# Patient Record
Sex: MALE | Race: OTHER | HISPANIC OR LATINO | URBAN - METROPOLITAN AREA
[De-identification: names, ages, dates, MRNs, and addresses within clinical notes are randomized per-mention and may not be internally consistent; named-entity substitution may affect disease eponyms.]

---

## 2024-08-13 ENCOUNTER — EMERGENCY (EMERGENCY)
Facility: HOSPITAL | Age: 49
LOS: 1 days | Discharge: HOME | End: 2024-08-13
Attending: EMERGENCY MEDICINE | Admitting: EMERGENCY MEDICINE
Payer: COMMERCIAL

## 2024-08-13 VITALS
TEMPERATURE: 99 F | HEART RATE: 100 BPM | RESPIRATION RATE: 16 BRPM | OXYGEN SATURATION: 98 % | SYSTOLIC BLOOD PRESSURE: 128 MMHG | DIASTOLIC BLOOD PRESSURE: 87 MMHG | WEIGHT: 190.04 LBS

## 2024-08-13 VITALS
OXYGEN SATURATION: 98 % | RESPIRATION RATE: 18 BRPM | SYSTOLIC BLOOD PRESSURE: 125 MMHG | TEMPERATURE: 99 F | DIASTOLIC BLOOD PRESSURE: 84 MMHG | HEART RATE: 102 BPM

## 2024-08-13 PROCEDURE — 87186 SC STD MICRODIL/AGAR DIL: CPT

## 2024-08-13 PROCEDURE — 81001 URINALYSIS AUTO W/SCOPE: CPT

## 2024-08-13 PROCEDURE — 87086 URINE CULTURE/COLONY COUNT: CPT

## 2024-08-13 PROCEDURE — 99284 EMERGENCY DEPT VISIT MOD MDM: CPT

## 2024-08-13 PROCEDURE — 99283 EMERGENCY DEPT VISIT LOW MDM: CPT

## 2024-08-13 RX ORDER — PHENAZOPYRIDINE HYDROCHLORIDE 200 MG/1
1 TABLET ORAL
Qty: 6 | Refills: 0
Start: 2024-08-13 | End: 2024-08-14

## 2024-08-13 RX ORDER — SULFAMETHOXAZOLE AND TRIMETHOPRIM 400; 80 MG/1; MG/1
1 TABLET ORAL ONCE
Refills: 0 | Status: COMPLETED | OUTPATIENT
Start: 2024-08-13 | End: 2024-08-13

## 2024-08-13 RX ORDER — SULFAMETHOXAZOLE AND TRIMETHOPRIM 400; 80 MG/1; MG/1
1 TABLET ORAL
Qty: 14 | Refills: 0
Start: 2024-08-13 | End: 2024-08-19

## 2024-08-13 RX ADMIN — SULFAMETHOXAZOLE AND TRIMETHOPRIM 1 TABLET(S): 400; 80 TABLET ORAL at 14:10

## 2024-08-13 NOTE — ED PROVIDER NOTE - NS ED ROS FT
Constitutional: No fever or chills.   GI: No nausea, vomiting, diarrhea or abdominal pain.  : See HPI  MS: No myalgia, muscle weakness, joint pain   Skin: No skin rash.   Except as documented in the HPI, all other systems are negative.

## 2024-08-13 NOTE — ED PROVIDER NOTE - CLINICAL SUMMARY MEDICAL DECISION MAKING FREE TEXT BOX
Pt p/w sx UTI w/o flank pain, abd pain, n/v, f/c. Non toxic appearing. No CVAT. Start Bactrim DS BID x 7 days. Pyridium for sx. D/w pt strict return precautions for f/c, n/v, flank pain, inability to urinate, or other concerning sx. Pt demonstrates understanding of plan

## 2024-08-13 NOTE — ED PROVIDER NOTE - PHYSICAL EXAMINATION
Constitutional: Well appearing, awake, alert, oriented to person, place, time/situation and in no apparent distress. non -toxic appearing  ENMT: Airway patent. Normal MM  Respiratory: Breathes easily. No increased WOB, tachypnea, hypoxia, or accessory mm use. Pt speaks in full sentences.   Gastrointestinal: Abd soft, NT, ND, NABS. No guarding, rebound, or rigidity. No pulsatile abdominal masses. No organomegaly appreciated. No CVAT   Musculoskeletal: Range of motion is not limited  Neuro: Alert and oriented x 3, face symmetric and speech fluent. Strength 5/5 x 4 ext and symmetric, nml gross motor movement, nml gait. No focal deficits noted.  Skin: Skin normal color for race, warm, dry and intact. No evidence of rash.  Psych: Alert and oriented to person, place, time/situation. normal mood and affect. no apparent risk to self or others.

## 2024-08-13 NOTE — ED PROVIDER NOTE - NSFOLLOWUPINSTRUCTIONS_ED_ALL_ED_FT
You are being treated for urinary tract infection (UTI). The treatment is antibiotics. You received the first dose of antibiotic, Bactrim (a sulfa antibiotic, Sulfamethoxazole; Trimethoprim ) here in the ED, and a prescription has been sent to your pharmacy. Take the antibiotics until completed. You are also being prescribed Pyridium (Phenazopyridine) for urinary discomfort. Please note this will turn your urine orange as a side effect, but is not harmful.      Follow up with your regular medical doctor. Return to the ED for fever, shaking chills, vomiting, flank/back pain, inability to urinate, or other concerning symptoms.     Urinary Tract Infection    A urinary tract infection (UTI) is an infection of any part of the urinary tract, which includes the kidneys, ureters, bladder, and urethra. Risk factors include ignoring your need to urinate, wiping back to front if female, being an uncircumcised male, and having diabetes or a weak immune system. Symptoms include frequent urination, pain or burning with urination, foul smelling urine, cloudy urine, pain in the lower abdomen, blood in the urine, and fever. If you were prescribed an antibiotic medicine, take it as told by your health care provider. Do not stop taking the antibiotic even if you start to feel better.    SEEK IMMEDIATE MEDICAL CARE IF YOU HAVE ANY OF THE FOLLOWING SYMPTOMS: severe back or abdominal pain, fever, inability to keep fluids or medicine down, dizziness/lightheadedness, or a change in mental status.     Phenazopyridine Tablets  What is this medication?  PHENAZOPYRIDINE (fen az oh PEER i omari) relieves symptoms caused by irritation of the urinary tract, such as pain, burning, and passing frequent small amounts of urine. It works by numbing the lining of the urinary tract, which decreases pain. It is not an antibiotic. It will not treat a urinary tract infection (UTI).    This medicine may be used for other purposes; ask your health care provider or pharmacist if you have questions.    COMMON BRAND NAME(S): AZO, AZO Urinary Pain Relief Maximum Strength, Azo-100, Azo-Gesic, Azo-Septic, Azo-Standard, Phenazo, Prodium, Pyridium, Urinary Analgesic, Uristat, Uristat Relief, Uristat Ultra, URO-PAIN    What should I tell my care team before I take this medication?  They need to know if you have any of these conditions:    Glucose-6-phosphate dehydrogenase (G6PD) deficiency  Kidney disease  An unusual or allergic reaction to phenazopyridine, other medications, foods, dyes, or preservatives  Pregnant or trying to get pregnant  Breast-feeding  How should I use this medication?  Take this medication by mouth with a glass of water. Follow the directions on the product label. Take after meals. Take your doses at regular intervals. Do not take your medication more often than directed. Do not skip doses or stop your medication early even if you feel better. Do not stop taking except on your care team's advice.    Talk to your care team about the use of this medication in children. Special care may be needed.    Overdosage: If you think you have taken too much of this medicine contact a poison control center or emergency room at once.    NOTE: This medicine is only for you. Do not share this medicine with others.    What if I miss a dose?  If you miss a dose, take it as soon as you can. If it is almost time for your next dose, take only that dose. Do not take double or extra doses.    What may interact with this medication?  Interactions are not expected.    This list may not describe all possible interactions. Give your health care provider a list of all the medicines, herbs, non-prescription drugs, or dietary supplements you use. Also tell them if you smoke, drink alcohol, or use illegal drugs. Some items may interact with your medicine.    What should I watch for while using this medication?  Tell your care team if your symptoms do not improve or if they get worse.    This medication colors body fluids red. This effect is harmless and will go away after you are done taking the medication. It will change urine to a dark orange or red color. The red color may stain clothing. Soft contact lenses may become permanently stained. It is best not to wear soft contact lenses while taking this medication.    If you have diabetes you may get a false positive result for sugar in your urine. Talk to your care team.    What side effects may I notice from receiving this medication?  Side effects that you should report to your care team as soon as possible:    Allergic reactions—skin rash, itching, hives, swelling of the face, lips, tongue, or throat  Side effects that usually do not require medical attention (report to your care team if they continue or are bothersome):    Dark yellow, orange, red, or brown urine  Upset stomach  This list may not describe all possible side effects. Call your doctor for medical advice about side effects. You may report side effects to FDA at 6-475-WVU-5079.    Where should I keep my medication?  Keep out of the reach of children and pets.    Store at room temperature between 15 and 30 degrees C (59 and 86 degrees F). Protect from light and moisture. Throw away any unused medication after the expiration date.    NOTE: This sheet is a summary. It may not cover all possible information. If you have questions about this medicine, talk to your doctor, pharmacist, or health care provider.    Sulfamethoxazole; Trimethoprim Tablets  What is this medication?  SULFAMETHOXAZOLE; TRIMETHOPRIM (suhl fuh meth OK onur zohl; trye METH oh prim) treats infections caused by bacteria. It belongs to a group of medications called sulfonamide antibiotics. It will not treat colds, the flu, or infections caused by viruses.    This medicine may be used for other purposes; ask your health care provider or pharmacist if you have questions.    COMMON BRAND NAME(S): Bacter-Aid DS, Bactrim, Bactrim DS, Septra, Septra DS    What should I tell my care team before I take this medication?  They need to know if you have any of these conditions:    G6PD deficiency  HIV or AIDS  Kidney disease  Liver disease  Low platelet levels  Low red blood cell levels  Poor nutrition  Stomach or intestine problems, such as colitis  Thyroid disease  An unusual or allergic reaction to sulfamethoxazole, trimethoprim, other medications, foods, dyes, or preservatives  Pregnant or trying to get pregnant  Breast-feeding  How should I use this medication?  Take this medication by mouth with a glass of water. Follow the directions on the prescription label. Take your medication at regular intervals. Do not take it more often than directed. Take all of your medication as directed even if you think you are better. Do not skip doses or stop your medication early.    Talk to your care team about the use of this medication in children. While this medication may be prescribed for children as young as 2 months for selected conditions, precautions do apply.    Overdosage: If you think you have taken too much of this medicine contact a poison control center or emergency room at once.    NOTE: This medicine is only for you. Do not share this medicine with others.    What if I miss a dose?  If you miss a dose, take it as soon as you can. If it is almost time for your next dose, take only that dose. Do not take double or extra doses.    What may interact with this medication?  Do not take this medication with any of the following:    Dofetilide  This medication may also interact with the following:    Amantadine  Certain medications for blood pressure or heart disease  Certain medications for depression, such as amitriptyline  Certain medications for diabetes, such as glipizide or glyburide  Certain medications that treat or prevent blood clots, such as warfarin  Cyclosporine  Digoxin  Diuretics  Estrogen and progestin hormones  Indomethacin  Methotrexate  Phenytoin  Procainamide  Pyrimethamine  Zidovudine  This list may not describe all possible interactions. Give your health care provider a list of all the medicines, herbs, non-prescription drugs, or dietary supplements you use. Also tell them if you smoke, drink alcohol, or use illegal drugs. Some items may interact with your medicine.    What should I watch for while using this medication?  Tell your care team if your symptoms do not start to get better or if they get worse.    Do not treat diarrhea with over the counter products. Contact your care team if you have diarrhea that lasts more than 2 days or if it is severe and watery.    This medication may cause serious skin reactions. They can happen weeks to months after starting the medication. Contact your care team right away if you notice fevers or flu-like symptoms with a rash. The rash may be red or purple and then turn into blisters or peeling of the skin. Or, you might notice a red rash with swelling of the face, lips or lymph nodes in your neck or under your arms.    This medication can make you more sensitive to the sun. Keep out of the sun. If you cannot avoid being in the sun, wear protective clothing and sunscreen. Do not use sun lamps or tanning beds/booths.    Be careful brushing or flossing your teeth or using a toothpick because you may get an infection or bleed more easily. If you have any dental work done, tell your dentist you are receiving this medication.    What side effects may I notice from receiving this medication?  Side effects that you should report to your care team as soon as possible:    Allergic reactions—skin rash, itching, hives, swelling of the face, lips, tongue, or throat  Aplastic anemia—unusual weakness or fatigue, dizziness, headache, trouble breathing, increased bleeding or bruising, fever, chills, cough, or sore throat  Dry cough, shortness of breath or trouble breathing  High potassium level—muscle weakness, fast or irregular heartbeat  Liver injury— right upper belly pain, loss of appetite, nausea, light-colored stool, dark yellow or brown urine, yellowing skin or eyes, unusual weakness or fatigue  Low blood sugar (hypoglycemia)—tremors or shaking, anxiety, sweating, cold or clammy skin, confusion, dizziness, rapid heartbeat  Low sodium level—muscle weakness, fatigue, dizziness, headache, confusion  Low thyroid levels (hypothyroidism)—unusual weakness or fatigue, increased sensitivity to cold, constipation, hair loss, dry skin, weight gain, feelings of depression  Rash, fever, and swollen lymph nodes  Redness, blistering, peeling, or loosening of the skin, including inside the mouth  Severe diarrhea, fever  Small, pus-filled bumps on skin  Unusual vaginal discharge, itching, or odor  Side effects that usually do not require medical attention (report to your care team if they continue or are bothersome):    Loss of appetite  Nausea  Vomiting  This list may not describe all possible side effects. Call your doctor for medical advice about side effects. You may report side effects to FDA at 7-412-FDA-4536.    Where should I keep my medication?  Keep out of the reach of children.    Store between 15 and 25 degrees C (59 to 77 degrees F). Protect from light. Keep the container tightly closed. Throw away any unused medication after the expiration date.    NOTE: This sheet is a summary. It may not cover all possible information. If you have questions about this medicine, talk to your doctor, pharmacist, or health care provider.

## 2024-08-13 NOTE — ED PROVIDER NOTE - PATIENT PORTAL LINK FT
You can access the FollowMyHealth Patient Portal offered by [facility] by registering at the following website: [email or web address]. By joining [employer]’s FollowMyHealth portal, you will also be able to view your health information using other applications (apps) compatible with our system.

## 2024-08-13 NOTE — ED ADULT NURSE NOTE - OBJECTIVE STATEMENT
Pt is a 49 yo M c/o difficulty urinating and hematuria x 4 days. Some bilateral flank discomfort. Denies n/v/d, f/c. Reports hx of kidney stones 20 years ago that passed on its own, feels sx today are similar.  Pmhx HTN, DM; compliant with meds.  On exam, pt in NAD with even and unlabored respirations. Ambulatory with steady gait. No CVA tenderness; abd soft and nondistended.

## 2024-08-13 NOTE — ED PROVIDER NOTE - OBJECTIVE STATEMENT
Pt w/ PMHx HTN, HLD, NIDDM p/w 4 days of deonte-colored urine, dysuria, frequency, urgency. No abd pain, flank pain, n/v, f/c. No polydipsia. He is circumcised (2016). No hx prostate disease. He had c-scope 1 week ago. No rectal pain.

## 2024-08-15 DIAGNOSIS — E78.5 HYPERLIPIDEMIA, UNSPECIFIED: ICD-10-CM

## 2024-08-15 DIAGNOSIS — R30.0 DYSURIA: ICD-10-CM

## 2024-08-15 DIAGNOSIS — N39.0 URINARY TRACT INFECTION, SITE NOT SPECIFIED: ICD-10-CM

## 2024-08-15 DIAGNOSIS — E11.9 TYPE 2 DIABETES MELLITUS WITHOUT COMPLICATIONS: ICD-10-CM

## 2024-08-15 DIAGNOSIS — I10 ESSENTIAL (PRIMARY) HYPERTENSION: ICD-10-CM

## 2024-08-16 RX ORDER — CEFPODOXIME PROXETIL 100 MG
1 TABLET ORAL
Qty: 14 | Refills: 0
Start: 2024-08-16 | End: 2024-08-22

## 2024-10-16 ENCOUNTER — EMERGENCY (EMERGENCY)
Facility: HOSPITAL | Age: 49
LOS: 1 days | Discharge: HOME | End: 2024-10-16
Attending: EMERGENCY MEDICINE | Admitting: EMERGENCY MEDICINE
Payer: COMMERCIAL

## 2024-10-16 VITALS
TEMPERATURE: 98 F | OXYGEN SATURATION: 99 % | WEIGHT: 199.96 LBS | DIASTOLIC BLOOD PRESSURE: 76 MMHG | HEART RATE: 77 BPM | SYSTOLIC BLOOD PRESSURE: 131 MMHG | RESPIRATION RATE: 17 BRPM

## 2024-10-16 DIAGNOSIS — N48.89 OTHER SPECIFIED DISORDERS OF PENIS: ICD-10-CM

## 2024-10-16 DIAGNOSIS — E11.9 TYPE 2 DIABETES MELLITUS WITHOUT COMPLICATIONS: ICD-10-CM

## 2024-10-16 DIAGNOSIS — Z87.448 PERSONAL HISTORY OF OTHER DISEASES OF URINARY SYSTEM: ICD-10-CM

## 2024-10-16 DIAGNOSIS — N48.6 INDURATION PENIS PLASTICA: ICD-10-CM

## 2024-10-16 DIAGNOSIS — I10 ESSENTIAL (PRIMARY) HYPERTENSION: ICD-10-CM

## 2024-10-16 DIAGNOSIS — B96.89 OTHER SPECIFIED BACTERIAL AGENTS AS THE CAUSE OF DISEASES CLASSIFIED ELSEWHERE: ICD-10-CM

## 2024-10-16 DIAGNOSIS — N30.00 ACUTE CYSTITIS WITHOUT HEMATURIA: ICD-10-CM

## 2024-10-16 LAB
APPEARANCE UR: ABNORMAL
BACTERIA # UR AUTO: ABNORMAL /HPF
BILIRUB UR-MCNC: NEGATIVE — SIGNIFICANT CHANGE UP
CAST: 0 /LPF — SIGNIFICANT CHANGE UP (ref 0–4)
COD CRY URNS QL: PRESENT
COLOR SPEC: YELLOW — SIGNIFICANT CHANGE UP
DIFF PNL FLD: ABNORMAL
GLUCOSE UR QL: 500 MG/DL
KETONES UR-MCNC: ABNORMAL MG/DL
LEUKOCYTE ESTERASE UR-ACNC: ABNORMAL
NITRITE UR-MCNC: POSITIVE
PH UR: 5.5 — SIGNIFICANT CHANGE UP (ref 5–8)
PROT UR-MCNC: 100 MG/DL
RBC CASTS # UR COMP ASSIST: 8 /HPF — HIGH (ref 0–4)
SP GR SPEC: 1.03 — SIGNIFICANT CHANGE UP (ref 1–1.03)
SQUAMOUS # UR AUTO: 0 /HPF — SIGNIFICANT CHANGE UP (ref 0–5)
UROBILINOGEN FLD QL: 0.2 MG/DL — SIGNIFICANT CHANGE UP (ref 0.2–1)
WBC UR QL: >998 /HPF — HIGH (ref 0–5)

## 2024-10-16 PROCEDURE — 99284 EMERGENCY DEPT VISIT MOD MDM: CPT

## 2024-10-16 PROCEDURE — 87086 URINE CULTURE/COLONY COUNT: CPT

## 2024-10-16 PROCEDURE — 87591 N.GONORRHOEAE DNA AMP PROB: CPT

## 2024-10-16 PROCEDURE — 99283 EMERGENCY DEPT VISIT LOW MDM: CPT

## 2024-10-16 PROCEDURE — 81001 URINALYSIS AUTO W/SCOPE: CPT

## 2024-10-16 PROCEDURE — 87186 SC STD MICRODIL/AGAR DIL: CPT

## 2024-10-16 PROCEDURE — 87491 CHLMYD TRACH DNA AMP PROBE: CPT

## 2024-10-16 RX ORDER — CEFPODOXIME PROXETIL 50 MG/5 ML
1 SUSPENSION, RECONSTITUTED, ORAL (ML) ORAL
Qty: 14 | Refills: 0
Start: 2024-10-16 | End: 2024-10-22

## 2024-10-16 RX ORDER — PHENAZOPYRIDINE HCL 200 MG
1 TABLET ORAL
Qty: 6 | Refills: 0
Start: 2024-10-16 | End: 2024-10-17

## 2024-10-16 NOTE — ED PROVIDER NOTE - CLINICAL SUMMARY MEDICAL DECISION MAKING FREE TEXT BOX
patient with 3 days of dysuria consistent with simple cystitis.  No signs or symptoms of acute systemic illness.  No signs or symptoms of pyelonephritis.  No evidence of epididymoorchitis.  No significant risk factor for sexually transmitted infections,   treated 2 months ago for cystitis, but did not complete the course of antibiotics.  Based on most recent culture sensitivities, will again prescribe cefpodoxime, patient instructed to complete the full course of antibiotics due to risk of developing resistance.

## 2024-10-16 NOTE — ED PROVIDER NOTE - OBJECTIVE STATEMENT
49-year-old male with history of diabetes, hypertension, Peyronie's disease who is treated here for simple cystitis 2 months ago.   initially was prescribed Bactrim DS, but changed to cefpodoxime based on urine culture sensitivities.  Patient states he took antibiotic for 2 or 3 days and started to feel better, so never completed the course.  He now presents with 3 days of burning pain in the penis that occurs at the end of urination.  There is been no bloody urine or discoloration of the urine, which is different than what he experienced in August.  He denies any urethral discharge, testicular pain or swelling, or pelvic pain or pressure.  He has no abdominal pain or flank pain.  He has no fever or chills.  He has no nausea or vomiting.    He says overall  he feels well.    Has not had any recent  instrumentation of the penis.  Monogamous with wife.  No hx of STIs.  Has a urologist in [place].

## 2024-10-16 NOTE — ED ADULT NURSE NOTE - OBJECTIVE STATEMENT
49y male presents to ED for urinary symptoms. Pt states he has had penile pain and burning x2 days. Pt has hx of cystitis and had recent treatment with antibiotics a couple of weeks ago. Denies blood in urine, fever/chills, flank pain. A&Ox4.

## 2024-10-16 NOTE — ED ADULT TRIAGE NOTE - CHIEF COMPLAINT QUOTE
Pt. a&ox4 p/w burning with urination for the last few days. States he was here last month and tx for UTI ; this feels the same. Denies f/c, hematuria or dysuria.

## 2024-10-16 NOTE — ED PROVIDER NOTE - PHYSICAL EXAMINATION
CONSTITUTIONAL: well-appearing, nontoxic, NAD   SKIN: Normal color and turgor.    HEAD: NC/AT.  EYES: Conjunctiva clear. Anicteric sclera.  ENT: Airway clear. Normal voice. MMM.  RESPIRATORY:  Normal respiratory rate and effort. Lungs CTAB  CARDIOVASCULAR:  RRR S1S2, no murmur  GI:  Abdomen soft, nontender.    : No CVAT.  No suprapubic tenderness.  No urethral discharge. No testicular swelling or tenderness.   MSK: Neck supple.  No LE edema or calf tenderness. No joint swelling or ROM limitation.  NEURO: Alert, clear mental status.  Speech clear. No focal deficits. Gait steady.

## 2024-10-16 NOTE — ED PROVIDER NOTE - PATIENT PORTAL LINK FT
You can access the FollowMyHealth Patient Portal offered by [facility] by registering at the following website: [email or web address]. By joining [email or web address]’s FollowMyHealth portal, you will also be able to view your health information using other applications (apps) compatible with our system.

## 2024-10-16 NOTE — ED PROVIDER NOTE - NSFOLLOWUPINSTRUCTIONS_ED_ALL_ED_FT
chest discomfort
Take the phenazopyridine 3 times/day for TWO days to help with the burning sensation.  Take the antibiotic (cefpodoxime) 2 times/day for SEVEN days to treat the infection. Make sure you complete the full course of antibiotics.    Please follow up with your urologist.  Return to the Emergency Department if you have any new or worsening symptoms, or if you have any concerns.  =========================  Urinary Tract Infection, Male  Body outline, showing the kidneys, ureters, bladder, and urethra.  A urinary tract infection (UTI) is an infection in your urinary tract. The urinary tract is made up of the organs that make, store, and get rid of pee (urine) in your body. These organs include:  The kidneys.  The ureters.  The bladder.  The urethra.  What are the causes?  Most UTIs are caused by germs called bacteria. They may be in or near your genitals. These germs grow and cause swelling in your urinary tract.    What increases the risk?  You're more likely to get a UTI if:  You have a soft tube called a catheter that drains your pee.  You can't control when you pee or poop.  You have trouble peeing because of:  A prostate that's bigger than normal.  A kidney stone.  A urinary blockage.  A nerve condition that affects your bladder.  Not getting enough to drink.  You're sexually active.  You're an older adult.  You're also more likely to get a UTI if you have other health problems, such as:  Diabetes.  A weak immune system. Your immune system is your body's defense system.  Sickle cell disease.  Injury of the spine.  What are the signs or symptoms?  Symptoms may include:  Needing to pee right away.  Peeing small amounts often.  Trouble getting the stream started.  Pain or burning when you pee.  Blood in your pee.  Pee that smells bad or odd.  Pain in your belly or lower back.  You may also:  Feel confused. This may be the first symptom in older adults.  Vomit.  Not feel hungry.  Feel tired or easily annoyed.  Have a fever or chills.  How is this diagnosed?  A UTI is diagnosed based on your medical history and an exam. You may also have other tests. These may include:  Pee tests.  Blood tests.  Tests for sexually transmitted infections (STIs).  If you've had more than one UTI, you may need to have imaging studies done to find out why you keep getting them.    How is this treated?  A UTI can be treated by:  Taking antibiotics or other medicines.  Drinking enough fluid to keep your pee pale yellow.  In rare cases, a UTI can cause a very bad condition called sepsis. Sepsis may be treated in the hospital.    Follow these instructions at home:  Medicines    Take your medicines only as told by your health care provider.  If you were given antibiotics, take them as told by your provider. Do not stop taking them even if you start to feel better.  General instructions    Make sure you:  Pee often and fully. Do not hold your pee for a long time.  Pee after you have sex.  Contact a health care provider if:  Your symptoms don't get better after 1–2 days of taking antibiotics.  Your symptoms go away and then come back.  You have a fever or chills.  You vomit or feel like you may vomit.  Get help right away if:  You have very bad pain in your back or lower belly.  You faint.  This information is not intended to replace advice given to you by your health care provider. Make sure you discuss any questions you have with your health care provider.

## 2024-10-17 LAB
C TRACH RRNA SPEC QL NAA+PROBE: SIGNIFICANT CHANGE UP
N GONORRHOEA RRNA SPEC QL NAA+PROBE: SIGNIFICANT CHANGE UP
SPECIMEN SOURCE: SIGNIFICANT CHANGE UP

## 2024-10-19 LAB
-  AMOXICILLIN/CLAVULANIC ACID: SIGNIFICANT CHANGE UP
-  AMPICILLIN/SULBACTAM: SIGNIFICANT CHANGE UP
-  AMPICILLIN: SIGNIFICANT CHANGE UP
-  CEFAZOLIN: SIGNIFICANT CHANGE UP
-  CEFEPIME: SIGNIFICANT CHANGE UP
-  CEFOXITIN: SIGNIFICANT CHANGE UP
-  CEFTRIAXONE: SIGNIFICANT CHANGE UP
-  CEFUROXIME: SIGNIFICANT CHANGE UP
-  CIPROFLOXACIN: SIGNIFICANT CHANGE UP
-  GENTAMICIN: SIGNIFICANT CHANGE UP
-  LEVOFLOXACIN: SIGNIFICANT CHANGE UP
-  NITROFURANTOIN: SIGNIFICANT CHANGE UP
-  PIPERACILLIN/TAZOBACTAM: SIGNIFICANT CHANGE UP
-  TOBRAMYCIN: SIGNIFICANT CHANGE UP
-  TRIMETHOPRIM/SULFAMETHOXAZOLE: SIGNIFICANT CHANGE UP
CULTURE RESULTS: ABNORMAL
METHOD TYPE: SIGNIFICANT CHANGE UP
ORGANISM # SPEC MICROSCOPIC CNT: ABNORMAL
ORGANISM # SPEC MICROSCOPIC CNT: SIGNIFICANT CHANGE UP
SPECIMEN SOURCE: SIGNIFICANT CHANGE UP

## 2025-01-14 NOTE — ED ADULT NURSE NOTE - CAS EDP DISCH TYPE
Detail Level: Detailed Size Of Lesion: 0.4 X Size Of Lesion In Cm (Optional): 0.9 Anatomic Location From Referring Provider: right superior helix Name Of The Referring Provider For Procedure: Dr. Walter Other Plan: Likely linear vs FTSG vs flap Date Scheduled For Mohs (Optional): 02/06/25 @8am Incorporate Mauc In Note: Yes Home